# Patient Record
Sex: MALE | Race: BLACK OR AFRICAN AMERICAN
[De-identification: names, ages, dates, MRNs, and addresses within clinical notes are randomized per-mention and may not be internally consistent; named-entity substitution may affect disease eponyms.]

---

## 2020-01-16 ENCOUNTER — HOSPITAL ENCOUNTER (EMERGENCY)
Dept: HOSPITAL 62 - ER | Age: 30
Discharge: HOME | End: 2020-01-16
Payer: SELF-PAY

## 2020-01-16 VITALS — SYSTOLIC BLOOD PRESSURE: 110 MMHG | DIASTOLIC BLOOD PRESSURE: 73 MMHG

## 2020-01-16 DIAGNOSIS — R10.813: ICD-10-CM

## 2020-01-16 DIAGNOSIS — R10.31: Primary | ICD-10-CM

## 2020-01-16 LAB
ADD MANUAL DIFF: NO
ALBUMIN SERPL-MCNC: 4.4 G/DL (ref 3.5–5)
ALP SERPL-CCNC: 70 U/L (ref 38–126)
ANION GAP SERPL CALC-SCNC: 7 MMOL/L (ref 5–19)
APPEARANCE UR: (no result)
APTT PPP: YELLOW S
AST SERPL-CCNC: 28 U/L (ref 17–59)
BASOPHILS # BLD AUTO: 0.1 10^3/UL (ref 0–0.2)
BASOPHILS NFR BLD AUTO: 0.8 % (ref 0–2)
BILIRUB DIRECT SERPL-MCNC: 0.2 MG/DL (ref 0–0.4)
BILIRUB SERPL-MCNC: 0.5 MG/DL (ref 0.2–1.3)
BILIRUB UR QL STRIP: NEGATIVE
BUN SERPL-MCNC: 14 MG/DL (ref 7–20)
CALCIUM: 10.1 MG/DL (ref 8.4–10.2)
CHLORIDE SERPL-SCNC: 104 MMOL/L (ref 98–107)
CO2 SERPL-SCNC: 30 MMOL/L (ref 22–30)
EOSINOPHIL # BLD AUTO: 0.3 10^3/UL (ref 0–0.6)
EOSINOPHIL NFR BLD AUTO: 4 % (ref 0–6)
ERYTHROCYTE [DISTWIDTH] IN BLOOD BY AUTOMATED COUNT: 13.3 % (ref 11.5–14)
GLUCOSE SERPL-MCNC: 71 MG/DL (ref 75–110)
GLUCOSE UR STRIP-MCNC: NEGATIVE MG/DL
HCT VFR BLD CALC: 40.5 % (ref 37.9–51)
HGB BLD-MCNC: 14 G/DL (ref 13.5–17)
KETONES UR STRIP-MCNC: (no result) MG/DL
LYMPHOCYTES # BLD AUTO: 2.1 10^3/UL (ref 0.5–4.7)
LYMPHOCYTES NFR BLD AUTO: 28.8 % (ref 13–45)
MCH RBC QN AUTO: 29.9 PG (ref 27–33.4)
MCHC RBC AUTO-ENTMCNC: 34.5 G/DL (ref 32–36)
MCV RBC AUTO: 87 FL (ref 80–97)
MONOCYTES # BLD AUTO: 0.8 10^3/UL (ref 0.1–1.4)
MONOCYTES NFR BLD AUTO: 11.6 % (ref 3–13)
NEUTROPHILS # BLD AUTO: 3.9 10^3/UL (ref 1.7–8.2)
NEUTS SEG NFR BLD AUTO: 54.8 % (ref 42–78)
NITRITE UR QL STRIP: NEGATIVE
PH UR STRIP: 7 [PH] (ref 5–9)
PLATELET # BLD: 278 10^3/UL (ref 150–450)
POTASSIUM SERPL-SCNC: 4.6 MMOL/L (ref 3.6–5)
PROT SERPL-MCNC: 7.8 G/DL (ref 6.3–8.2)
PROT UR STRIP-MCNC: 30 MG/DL
RBC # BLD AUTO: 4.68 10^6/UL (ref 4.35–5.55)
SP GR UR STRIP: 1.03
TOTAL CELLS COUNTED % (AUTO): 100 %
UROBILINOGEN UR-MCNC: 4 MG/DL (ref ?–2)
WBC # BLD AUTO: 7.2 10^3/UL (ref 4–10.5)

## 2020-01-16 PROCEDURE — 36415 COLL VENOUS BLD VENIPUNCTURE: CPT

## 2020-01-16 PROCEDURE — 76770 US EXAM ABDO BACK WALL COMP: CPT

## 2020-01-16 PROCEDURE — 74177 CT ABD & PELVIS W/CONTRAST: CPT

## 2020-01-16 PROCEDURE — 80053 COMPREHEN METABOLIC PANEL: CPT

## 2020-01-16 PROCEDURE — 99284 EMERGENCY DEPT VISIT MOD MDM: CPT

## 2020-01-16 PROCEDURE — 81001 URINALYSIS AUTO W/SCOPE: CPT

## 2020-01-16 PROCEDURE — 85025 COMPLETE CBC W/AUTO DIFF WBC: CPT

## 2020-01-16 NOTE — ER DOCUMENT REPORT
ED Medical Screen (RME)





- General


Chief Complaint: Lower Abdominal Pain


Stated Complaint: RIGHT FLANK PAIN


Time Seen by Provider: 01/16/20 14:16


Mode of Arrival: Ambulatory


Information source: Patient


Notes: 





29-year-old male with no prior history presents complaining of right-sided flank

pain for the past couple months increased pain recently.  Denies trauma.  Denies

fever vomiting diarrhea.  Reports it hurts more after he voids.  Denies 

hematuria.  Denies history of kidney stones.











I have greeted and performed a rapid initial assessment of this patient.  A 

comprehensive ED assessment and evaluation of the patient, analysis of test 

results and completion of the medical decision making process will be conducted 

by additional ED providers.


TRAVEL OUTSIDE OF THE [place] IN LAST 30 DAYS: No





- Related Data


Allergies/Adverse Reactions: 


                                        





No Known Allergies Allergy (Verified 01/16/20 14:12)


   








Home Medications: denies





Past Medical History





- Social History


Chew tobacco use (# tins/day): No


Frequency of alcohol use: Social


Drug Abuse: None





Physical Exam





- Vital signs


Vitals: 





                                        











Temp Pulse Resp BP Pulse Ox


 


 98.9 F   82   16   116/82   99 


 


 01/16/20 13:28  01/16/20 13:28  01/16/20 13:28  01/16/20 13:28  01/16/20 13:28














Course





- Vital Signs


Vital signs: 





                                        











Temp Pulse Resp BP Pulse Ox


 


 98.9 F   82   16   116/82   99 


 


 01/16/20 13:28  01/16/20 13:28  01/16/20 13:28  01/16/20 13:28  01/16/20 13:28

## 2020-01-16 NOTE — RADIOLOGY REPORT (SQ)
EXAM DESCRIPTION:  CT ABD/PELVIS WITH IV ONLY



COMPLETED DATE/TIME:  1/16/2020 4:57 pm



REASON FOR STUDY:  RLQ pain



COMPARISON:  None.



TECHNIQUE:  CT scan of the abdomen and pelvis performed using helical scanning technique with dynamic
 intravenous contrast injection.  No oral contrast. Images reviewed with lung, soft tissue, and bone 
windows. Reconstructed coronal and sagittal MPR images reviewed. Delayed images for evaluation of the
 urinary system also acquired. All images stored on PACS.

All CT scanners at this facility use dose modulation, iterative reconstruction, and/or weight based d
osing when appropriate to reduce radiation dose to as low as reasonably achievable (ALARA).

[facility]: Dose Right  [facility]: CareDose    [facility]: Dose Right    [facility]: Teradose 4D    [facility]: Smart Technologies



CONTRAST TYPE AND DOSE:  contrast/concentration: Isovue 350.00 mg/ml; Total Contrast Delivered: 94.0 
ml; Total Saline Delivered: 54.0 ml



RENAL FUNCTION:  BUN 14 creatinine 1.02.



RADIATION DOSE:  CT Rad equipment meets quality standard of care and radiation dose reduction techniq
ues were employed. CTDIvol: 7.1 - 9.8 mGy. DLP: 913 mGy-cm..



LIMITATIONS:  None.



FINDINGS:  LOWER CHEST: No significant findings. No nodules or infiltrates.

LIVER: Normal size. No masses.  No dilated ducts.

SPLEEN: Normal size. No focal lesions.

PANCREAS: No masses. No significant calcifications. No adjacent inflammation or peripancreatic fluid 
collections. Pancreatic duct not dilated.

GALLBLADDER: No identified stones by CT criteria. No inflammatory changes to suggest cholecystitis.

ADRENAL GLANDS: No significant masses or asymmetry.

RIGHT KIDNEY AND URETER: No solid masses.   No significant calcifications.   No hydronephrosis or hyd
roureter.

LEFT KIDNEY AND URETER: No solid masses.   No significant calcifications.   No hydronephrosis or hydr
oureter.

AORTA AND VESSELS: No aneurysm. No dissection. Renal arteries, SMA, celiac without stenosis.

RETROPERITONEUM: No retroperitoneal adenopathy, hemorrhage or masses.

BOWEL AND PERITONEAL CAVITY: No masses or inflammatory changes. No free fluid or peritoneal masses.

APPENDIX: Normal.

PELVIS: No mass.  No free fluid. Normal bladder.

ABDOMINAL WALL: No masses. No hernias.

BONES: No significant or acute findings.

OTHER: No other significant finding.



IMPRESSION:  NO SIGNIFICANT OR ACUTE FINDING IN THE ABDOMEN OR PELVIS ON CT SCAN WITH IV CONTRAST.



TECHNICAL DOCUMENTATION:  JOB ID:  [id number]

Quality ID # [id number]: Final reports with documentation of one or more dose reduction techniques (e.g., Au
tomated exposure control, adjustment of the mA and/or kV according to patient size, use of iterative 
reconstruction technique)

 2011 [employer]- All Rights Reserved



Reading location - IP/workstation name: [email or web address]

## 2020-01-16 NOTE — RADIOLOGY REPORT (SQ)
EXAM DESCRIPTION:  U/S RETROPERITON (RENAL/AORTA)



COMPLETED DATE/TIME:  1/16/2020 3:49 pm



REASON FOR STUDY:  flank pain



COMPARISON:  None.



TECHNIQUE:  Dynamic and static grayscale images acquired of the kidneys and bladder and recorded on P
ACS. Additional selected color Doppler and spectral images recorded.



LIMITATIONS:  None.



FINDINGS:  RIGHT KIDNEY: Normal size. Normal echogenicity. No solid or suspicious masses. No hydronep
hrosis. No calcifications.

LEFT KIDNEY:  Normal size. Normal echogenicity. No solid or suspicious masses. No hydronephrosis. No 
calcifications.

BLADDER: No masses.

OTHER FINDINGS: No other significant finding.



IMPRESSION:  NORMAL RENAL AND BLADDER ULTRASOUND.



TECHNICAL DOCUMENTATION:  JOB ID:  [id number]

 2011 [employer]- All Rights Reserved



Reading location - IP/workstation name: [email or web address]

## 2020-01-16 NOTE — ER DOCUMENT REPORT
ED General





- General


Chief Complaint: Lower Abdominal Pain


Stated Complaint: RIGHT FLANK PAIN


Time Seen by Provider: 01/16/20 14:16


Primary Care Provider: 


[facility] [Provider Group] - Follow up in 3-5 days


LILLIANA KIM MD [COMMUNITY BASED STAFF] - Follow up in 3-5 days


Mode of Arrival: Ambulatory


Notes: 





29-year-old male presents with right flank pain that is been ongoing for the 

last couple months, worse the last few weeks.  Patient denies any 

nausea/vomiting/diarrhea/constipation, urinary symptoms, fever, chest pain, or 

shortness of breath.  Upon further evaluation patient states he is actually 

having right lower quadrant pain.  Patient states pain is worse with movement 

and standing for long periods of time.  Better with rest.


TRAVEL OUTSIDE OF THE [place] IN LAST 30 DAYS: No





- Related Data


Allergies/Adverse Reactions: 


                                        





No Known Allergies Allergy (Verified 01/16/20 14:12)


   








Home Medications: denies





Past Medical History





- General


Information source: Patient





- Social History


Smoking Status: Never Smoker


Chew tobacco use (# tins/day): No


Frequency of alcohol use: Social


Drug Abuse: None


Family History: None


Patient has suicidal ideation: No


Patient has homicidal ideation: No





Review of Systems





- Review of Systems


Notes: 





Constitutional: Negative for fever.


HENT: Negative for sore throat.


Eyes: Negative for visual changes.


Cardiovascular: Negative for chest pain.


Respiratory: Negative for shortness of breath.


Gastrointestinal: Positive for abdominal pain.  Negative for vomiting or 

diarrhea.


Genitourinary: Negative for dysuria.


Musculoskeletal: Negative for back pain.


Skin: Negative for rash.


Neurological: Negative for headaches, weakness or numbness.





10 point ROS negative except as marked above and in HPI.





Physical Exam





- Vital signs


Vitals: 


                                        











Temp Pulse Resp BP Pulse Ox


 


 98.9 F   82   16   116/82   99 


 


 01/16/20 13:28  01/16/20 13:28  01/16/20 13:28  01/16/20 13:28  01/16/20 13:28














- Notes


Notes: 





GENERAL: Well-appearing, well-nourished and in no acute distress.


HEAD: Atraumatic, normocephalic.


EYES: Extraocular movements intact, sclera anicteric, conjunctiva are normal.


NECK: Normal range of motion, supple without lymphadenopathy or JVD.


LUNGS: Breath sounds clear to auscultation bilaterally and equal.  No wheezes 

rales or rhonchi.


HEART: Regular rate and rhythm without murmurs, rubs or gallops.


ABDOMEN: Soft, tenderness to RLQ.  No guarding, no rebound.  No masses 

appreciated. No CVA tenderness.


EXTREMITIES: Normal range of motion, no pitting or edema.  No clubbing or 

cyanosis.


NEUROLOGICAL: Cranial nerves II through XII grossly intact.  Normal speech, 

normal gait.


PSYCH: Normal mood, normal affect.


SKIN: Warm, Dry, normal turgor, no rashes or lesions noted.





Course





- Re-evaluation


Re-evalutation: 





01/16/20 nontoxic, well-appearing.  Abdomen soft, tenderness to right lower 

quadrant.  No CVA tenderness.  Patient denies any urinary symptoms.  Denies 

nausea/vomiting/diarrhea/constipation.  CBC, CMP, UA, ultrasound of kidneys were

ordered out in triage.  CBC is within normal limits with no leukocytosis or 

anemia.  CMP is within normal limits with a mildly low glucose of 71.  UA shows 

no hematuria or infection.  Ultrasound of kidneys is normal.  Due to point 

tenderness to right lower quadrant CT abdomen/pelvis with IV contrast was 

ordered.  Patient offered analgesics however declining at this time.





01/16/20 17:21 CT abdomen/pelvis is negative. 





01/16/20 Discussed all results with pt. Pt given bentyl to try. If does not wo

rk, instructed to take Tylenol/Motrin. Strict return precautions. Close follow 

up with PCP. Pt voices understanding and agrees with plan of care.





- Vital Signs


Vital signs: 


                                        











Temp Pulse Resp BP Pulse Ox


 


 97.7 F   85   14   110/73   100 


 


 01/16/20 17:38  01/16/20 17:38  01/16/20 17:38  01/16/20 17:38  01/16/20 17:38














- Laboratory


Result Diagrams: 


                                 01/16/20 14:25





                                 01/16/20 14:25


Laboratory results interpreted by me: 


                                        











  01/16/20 01/16/20





  14:25 14:25


 


Glucose  71 L 


 


Urine Protein   30 H


 


Urine Ketones   TRACE H


 


Urine Urobilinogen   4.0 H


 


Urine Ascorbic Acid   40 H














Discharge





- Discharge


Clinical Impression: 


Abdominal pain


Qualifiers:


 Abdominal location: right lower quadrant Qualified Code(s): R10.31 - Right 

lower quadrant pain





Condition: Stable


Disposition: HOME, SELF-CARE


Instructions:  Abdominal Pain ([facility])


Additional Instructions: 


Your CT abdomen pelvis is normal.  Your lab work is within normal limits.  

Please take ibuprofen or Tylenol for the pain.  Please follow-up with your 

primary care doctor or 1 of the clinics listed in 3 to 5 days.  Return 

immediately to ER if you start having any worsening symptoms, including 

increasing abdominal pain, nausea/vomiting, fever, blood in your urine, burning 

when peeing, inability to urinate, diarrhea/constipation, or any other symptoms 

that are concerning to you.


Prescriptions: 


Dicyclomine HCl [Bentyl 20 mg Tablet] 20 mg PO QID #40 tablet


Forms:  Return to Work


Referrals: 


LILLIANA KIM MD [COMMUNITY BASED STAFF] - Follow up in 3-5 days


[facility] [Provider Group] - Follow up in 3-5 days

## 2020-01-27 ENCOUNTER — HOSPITAL ENCOUNTER (EMERGENCY)
Dept: HOSPITAL 62 - ER | Age: 30
Discharge: HOME | End: 2020-01-27
Payer: COMMERCIAL

## 2020-01-27 VITALS — DIASTOLIC BLOOD PRESSURE: 68 MMHG | SYSTOLIC BLOOD PRESSURE: 125 MMHG

## 2020-01-27 DIAGNOSIS — S61.011D: ICD-10-CM
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
## 2020-01-27 DIAGNOSIS — S51.811D: Primary | ICD-10-CM

## 2020-01-27 PROCEDURE — 99281 EMR DPT VST MAYX REQ PHY/QHP: CPT

## 2020-01-27 NOTE — ER DOCUMENT REPORT
HPI





- HPI


Time Seen by Provider: 01/27/20 13:24


Notes: 





29-year-old male patient presenting to the emergency department with request for

suture removal.  Patient had sutures placed approximately 8 days ago.  Patient 

denies any symptoms, states the wounds are healing well.








- REPRODUCTIVE


Reproductive: DENIES: Pregnant:





Past Medical History





- General


Information source: Patient





- Social History


Smoking Status: Never Smoker


Frequency of alcohol use: None


Drug Abuse: None


Family History: None





- Medical History


Medical History: Negative


Surgical Hx: Negative





- Immunizations


Immunizations up to date: Yes





Vertical Provider Document





- CONSTITUTIONAL


Notes: 





PHYSICAL EXAMINATION:





GENERAL: Well-appearing, well-nourished and in no acute distress.





HEAD: Atraumatic, normocephalic.





EYES: Pupils equal round extraocular movements intact,  conjunctiva are normal.





ENT: Nares patent





NECK: Normal range of motion





LUNGS: No respiratory distress





Musculoskeletal: Normal range of motion





NEUROLOGICAL:  Normal speech, normal gait. 





PSYCH: Normal mood, normal affect.





SKIN: Healing laceration noted to right forearm and right thumb.














- INFECTION CONTROL


TRAVEL OUTSIDE OF THE [place] IN LAST 30 DAYS: No





Course





- Re-evaluation


Re-evalutation: 





Sutures removed, patient tolerated well, 18 sutures removed in total.








- Vital Signs


Vital signs: 


                                        











Temp Pulse Resp BP Pulse Ox


 


 98.4 F   80   16   125/68   99 


 


 01/27/20 12:30  01/27/20 12:30  01/27/20 12:30  01/27/20 12:30  01/27/20 12:30














Discharge





- Discharge


Clinical Impression: 


 Encounter for removal of sutures





Condition: Stable


Disposition: HOME, SELF-CARE


Additional Instructions: 


Your sutures were removed today.  You can continue to put a thin layer of triple

antibiotic ointment to the area for the next 1 to 2 weeks.  Keep clean and dry. 

Watch for signs of infection to include increased redness, red streaking, 

drainage or development of fever.  If these occur return to the emergency 

department.

## 2020-02-10 ENCOUNTER — HOSPITAL ENCOUNTER (EMERGENCY)
Dept: HOSPITAL 62 - ER | Age: 30
Discharge: HOME | End: 2020-02-10
Payer: COMMERCIAL

## 2020-02-10 VITALS — DIASTOLIC BLOOD PRESSURE: 73 MMHG | SYSTOLIC BLOOD PRESSURE: 118 MMHG
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
## 2020-02-10 DIAGNOSIS — L08.9: ICD-10-CM

## 2020-02-10 DIAGNOSIS — S51.811D: Primary | ICD-10-CM

## 2020-02-10 PROCEDURE — 99282 EMERGENCY DEPT VISIT SF MDM: CPT
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
## 2020-10-31 ENCOUNTER — HOSPITAL ENCOUNTER (EMERGENCY)
Dept: HOSPITAL 62 - ER | Age: 30
Discharge: HOME | End: 2020-10-31
Payer: COMMERCIAL

## 2020-10-31 VITALS — SYSTOLIC BLOOD PRESSURE: 143 MMHG | DIASTOLIC BLOOD PRESSURE: 68 MMHG

## 2020-10-31 DIAGNOSIS — M25.562: ICD-10-CM

## 2020-10-31 DIAGNOSIS — M25.561: Primary | ICD-10-CM

## 2020-10-31 PROCEDURE — 99283 EMERGENCY DEPT VISIT LOW MDM: CPT

## 2020-10-31 NOTE — RADIOLOGY REPORT (SQ)
EXAM DESCRIPTION: 



XR KNEE 4 OR MORE VIEWS



COMPLETED DATE/TME:  10/31/2020 20:02



CLINICAL HISTORY: 



30 years, Male, pain



COMPARISON:

None.



NUMBER OF VIEWS:

4



TECHNIQUE:

AP, lateral, bilateral oblique views of the right knee were

obtained.



LIMITATIONS:

None.



FINDINGS:



There is no bone, joint, or definite soft tissue abnormality.



IMPRESSION:



Negative study

 



copyright 2011 [employer]- All Rights Reserved

## 2020-10-31 NOTE — ER DOCUMENT REPORT
ED Extremity Problem, Lower





- General


Chief Complaint: Knee Pain


Stated Complaint: KNEE PAIN


Time Seen by Provider: 10/31/20 19:52


Notes: 





CHIEF COMPLAINT: Left knee pain





HPI: 30-year-old male presenting for left knee pain for 2 days.  No trauma.  

States he did play football in high school believes that this may be just a 

result of injury sustained back then.  Took no medications for his symptoms.  

Patient walked a decent distance to the emergency department tonight for 

evaluation.  States he had to call out of work tonight because of the knee issue

requesting a work note





ROS: See HPI - all other systems were reviewed and are otherwise negative


Constitutional: no fever 


Integumentary: no rash 


Allergy: no hives 


Musculoskeletal: + extremity pain or swelling 


Neurological: no numbness/tingling, no weakness





MEDICATIONS: I agree with the patient medications as charted by the RN.





ALLERGIES: I agree with the allergies as charted by the RN.





PAST MEDICAL HISTORY/PAST SURGICAL HISTORY: Reviewed and agree as charted by RN.





SOCIAL HISTORY: Reviewed and agree as charted by RN.





FAMILY HISTORY: No significant familial comorbid conditions directly related to 

patient complaint





EXAM:


Reviewed vital signs as charted by RN.


CONSTITUTIONAL: Alert and oriented and responds appropriately to questions. 

Well-appearing; well-nourished


HEAD: Normocephalic; atraumatic


EYES: Conjunctivae clear, sclerae non-icteric


ENT: normal nose; no rhinorrhea; moist mucous membranes


NECK: Supple without meningismus


CARD: RRR; no murmurs, no clicks, no rubs, no gallops; symmetric distal pulses


RESP: Normal chest excursion without splinting or tachypnea


ABD/GI:  non-distended


BACK:  The back appears normal 


EXT: Normal ROM in all joints; no cyanosis, no visible effusion to the left 

knee.  No ballottement of the patella.  Mild tenderness over the anterior knee 

on palpation.  Negative anterior drawer sign.  No laxity on varus or valgus 

rotation.  Gait is normal   


SKIN: Normal color for age and race; warm; dry; good turgor; no acute lesions 

noted


NEURO: Moves all extremities equally; Motor and sensory function intact 


PSYCH: The patient's mood and manner are appropriate. Grooming and personal 

hygiene are appropriate.





MDM: 30-year-old male 2 days of nontraumatic left knee pain.  Will obtain an x-

ray but if negative anticipate discharge home on anti-inflammatories to follow-

up with orthopedics patient primarily requesting a work note


TRAVEL OUTSIDE OF THE [place] IN LAST 30 DAYS: No





- Related Data


Allergies/Adverse Reactions: 


                                        





No Known Allergies Allergy (Verified 01/16/20 14:12)


   











Past Medical History





- Social History


Smoking Status: Never Smoker


Chew tobacco use (# tins/day): No


Frequency of alcohol use: Occasional


Drug Abuse: None


Family History: None





- Immunizations


Immunizations up to date: Yes





Physical Exam





- Vital signs


Vitals: 


                                        











Temp


 


 98.5 F 


 


 10/31/20 20:00














Course





- Re-evaluation


Re-evalutation: 





10/31/20 20:23


On my review of the patient's knee x-ray I do not visualize a bony injury.  Will

discharge on anti-inflammatories orthopedic referral





- Vital Signs


Vital signs: 


                                        











Temp Pulse Resp BP Pulse Ox


 


 98.5 F   96   18   143/68 H  99 


 


 10/31/20 20:00  10/31/20 20:06  10/31/20 20:06  10/31/20 20:06  10/31/20 20:06














Discharge





- Discharge


Clinical Impression: 


Knee pain, right


Qualifiers:


 Chronicity: acute Qualified Code(s): M25.561 - Pain in right knee





Condition: Stable


Disposition: HOME, SELF-CARE


Additional Instructions: 


X-ray imaging did not show acute findings today.  Take the anti-inflammatories 

for pain, ice the knee twice daily for 5 to 10 minutes at a time do not place 

ice directly on the skin.  Follow-up closely with orthopedics for reevaluation 

call for appointment


Prescriptions: 


Diclofenac Sodium [Voltaren 50 Mg Tablet.] 50 mg PO BID #20 tablet.


Forms:  Return to Work


Referrals: 


CARLOZ BARNARD JR,  [ACTIVE PROVISIONAL STAFF] - Follow up as needed